# Patient Record
Sex: FEMALE | Race: WHITE | Employment: OTHER | URBAN - METROPOLITAN AREA
[De-identification: names, ages, dates, MRNs, and addresses within clinical notes are randomized per-mention and may not be internally consistent; named-entity substitution may affect disease eponyms.]

---

## 2020-03-04 ENCOUNTER — HOSPITAL ENCOUNTER (OUTPATIENT)
Age: 65
Discharge: ANOTHER HOSPITAL | End: 2020-03-04
Attending: EMERGENCY MEDICINE
Payer: MEDICARE

## 2020-03-04 ENCOUNTER — HOSPITAL ENCOUNTER (EMERGENCY)
Facility: HOSPITAL | Age: 65
Discharge: HOME | End: 2020-03-04
Attending: EMERGENCY MEDICINE
Payer: MEDICARE

## 2020-03-04 VITALS
DIASTOLIC BLOOD PRESSURE: 79 MMHG | WEIGHT: 162 LBS | BODY MASS INDEX: 32.66 KG/M2 | OXYGEN SATURATION: 96 % | HEART RATE: 74 BPM | HEIGHT: 59 IN | RESPIRATION RATE: 18 BRPM | SYSTOLIC BLOOD PRESSURE: 113 MMHG | TEMPERATURE: 98 F

## 2020-03-04 DIAGNOSIS — R10.31 BILATERAL LOWER ABDOMINAL PAIN: Primary | ICD-10-CM

## 2020-03-04 DIAGNOSIS — N30.00 ACUTE CYSTITIS WITHOUT HEMATURIA: Primary | ICD-10-CM

## 2020-03-04 DIAGNOSIS — R10.32 BILATERAL LOWER ABDOMINAL PAIN: Primary | ICD-10-CM

## 2020-03-04 LAB
ALBUMIN SERPL-MCNC: 4.1 G/DL (ref 3.4–5)
ALP LIVER SERPL-CCNC: 75 U/L (ref 50–130)
ALT SERPL-CCNC: 31 U/L (ref 13–56)
ANION GAP SERPL CALC-SCNC: 8 MMOL/L (ref 0–18)
AST SERPL-CCNC: 13 U/L (ref 15–37)
BASOPHILS # BLD AUTO: 0.06 X10(3) UL (ref 0–0.2)
BASOPHILS NFR BLD AUTO: 0.4 %
BILIRUB DIRECT SERPL-MCNC: 0.1 MG/DL (ref 0–0.2)
BILIRUB SERPL-MCNC: 0.6 MG/DL (ref 0.1–2)
BILIRUB UR QL STRIP: NEGATIVE
BILIRUB UR QL: NEGATIVE
BUN BLD-MCNC: 20 MG/DL (ref 7–18)
BUN/CREAT SERPL: 20.2 (ref 10–20)
CALCIUM BLD-MCNC: 9.4 MG/DL (ref 8.5–10.1)
CHLORIDE SERPL-SCNC: 107 MMOL/L (ref 98–112)
CLARITY UR: CLEAR
CO2 SERPL-SCNC: 25 MMOL/L (ref 21–32)
COLOR UR: YELLOW
CREAT BLD-MCNC: 0.99 MG/DL (ref 0.55–1.02)
DEPRECATED RDW RBC AUTO: 42.3 FL (ref 35.1–46.3)
EOSINOPHIL # BLD AUTO: 0 X10(3) UL (ref 0–0.7)
EOSINOPHIL NFR BLD AUTO: 0 %
ERYTHROCYTE [DISTWIDTH] IN BLOOD BY AUTOMATED COUNT: 12.5 % (ref 11–15)
GLUCOSE BLD-MCNC: 131 MG/DL (ref 70–99)
GLUCOSE UR STRIP-MCNC: NEGATIVE MG/DL
GLUCOSE UR-MCNC: NEGATIVE MG/DL
HCT VFR BLD AUTO: 40.1 % (ref 35–48)
HGB BLD-MCNC: 13.9 G/DL (ref 12–16)
IMM GRANULOCYTES # BLD AUTO: 0.05 X10(3) UL (ref 0–1)
IMM GRANULOCYTES NFR BLD: 0.4 %
KETONES UR-MCNC: 20 MG/DL
LIPASE SERPL-CCNC: 124 U/L (ref 73–393)
LYMPHOCYTES # BLD AUTO: 1.76 X10(3) UL (ref 1–4)
LYMPHOCYTES NFR BLD AUTO: 12.4 %
M PROTEIN MFR SERPL ELPH: 7.1 G/DL (ref 6.4–8.2)
MCH RBC QN AUTO: 32 PG (ref 26–34)
MCHC RBC AUTO-ENTMCNC: 34.7 G/DL (ref 31–37)
MCV RBC AUTO: 92.2 FL (ref 80–100)
MONOCYTES # BLD AUTO: 0.64 X10(3) UL (ref 0.1–1)
MONOCYTES NFR BLD AUTO: 4.5 %
NEUTROPHILS # BLD AUTO: 11.71 X10 (3) UL (ref 1.5–7.7)
NEUTROPHILS # BLD AUTO: 11.71 X10(3) UL (ref 1.5–7.7)
NEUTROPHILS NFR BLD AUTO: 82.3 %
NITRITE UR QL STRIP.AUTO: POSITIVE
NITRITE UR QL STRIP: POSITIVE
OSMOLALITY SERPL CALC.SUM OF ELEC: 294 MOSM/KG (ref 275–295)
PH UR STRIP: 5.5 [PH]
PH UR: 5 [PH] (ref 5–8)
PLATELET # BLD AUTO: 244 10(3)UL (ref 150–450)
POTASSIUM SERPL-SCNC: 4.4 MMOL/L (ref 3.5–5.1)
PROT UR STRIP-MCNC: NEGATIVE MG/DL
PROT UR-MCNC: NEGATIVE MG/DL
RBC # BLD AUTO: 4.35 X10(6)UL (ref 3.8–5.3)
RBC #/AREA URNS AUTO: 10 /HPF
SODIUM SERPL-SCNC: 140 MMOL/L (ref 136–145)
SP GR UR STRIP: 1.02
SP GR UR STRIP: 1.02 (ref 1–1.03)
UROBILINOGEN UR STRIP-ACNC: <2
UROBILINOGEN UR STRIP-ACNC: <2 MG/DL
WBC # BLD AUTO: 14.2 X10(3) UL (ref 4–11)
WBC #/AREA URNS AUTO: 72 /HPF

## 2020-03-04 PROCEDURE — 80076 HEPATIC FUNCTION PANEL: CPT | Performed by: EMERGENCY MEDICINE

## 2020-03-04 PROCEDURE — 99203 OFFICE O/P NEW LOW 30 MIN: CPT

## 2020-03-04 PROCEDURE — 96365 THER/PROPH/DIAG IV INF INIT: CPT

## 2020-03-04 PROCEDURE — 85025 COMPLETE CBC W/AUTO DIFF WBC: CPT

## 2020-03-04 PROCEDURE — 80048 BASIC METABOLIC PNL TOTAL CA: CPT

## 2020-03-04 PROCEDURE — 96375 TX/PRO/DX INJ NEW DRUG ADDON: CPT

## 2020-03-04 PROCEDURE — 87086 URINE CULTURE/COLONY COUNT: CPT | Performed by: EMERGENCY MEDICINE

## 2020-03-04 PROCEDURE — 81001 URINALYSIS AUTO W/SCOPE: CPT | Performed by: EMERGENCY MEDICINE

## 2020-03-04 PROCEDURE — 85025 COMPLETE CBC W/AUTO DIFF WBC: CPT | Performed by: EMERGENCY MEDICINE

## 2020-03-04 PROCEDURE — 87086 URINE CULTURE/COLONY COUNT: CPT

## 2020-03-04 PROCEDURE — 87088 URINE BACTERIA CULTURE: CPT | Performed by: EMERGENCY MEDICINE

## 2020-03-04 PROCEDURE — 87186 SC STD MICRODIL/AGAR DIL: CPT | Performed by: EMERGENCY MEDICINE

## 2020-03-04 PROCEDURE — 96366 THER/PROPH/DIAG IV INF ADDON: CPT

## 2020-03-04 PROCEDURE — 99284 EMERGENCY DEPT VISIT MOD MDM: CPT

## 2020-03-04 PROCEDURE — 99204 OFFICE O/P NEW MOD 45 MIN: CPT

## 2020-03-04 PROCEDURE — 80048 BASIC METABOLIC PNL TOTAL CA: CPT | Performed by: EMERGENCY MEDICINE

## 2020-03-04 PROCEDURE — 81001 URINALYSIS AUTO W/SCOPE: CPT

## 2020-03-04 PROCEDURE — 83690 ASSAY OF LIPASE: CPT | Performed by: EMERGENCY MEDICINE

## 2020-03-04 PROCEDURE — 81002 URINALYSIS NONAUTO W/O SCOPE: CPT

## 2020-03-04 RX ORDER — MELOXICAM 7.5 MG/1
7.5 TABLET ORAL DAILY PRN
Qty: 14 TABLET | Refills: 0 | Status: SHIPPED | OUTPATIENT
Start: 2020-03-04 | End: 2020-03-18

## 2020-03-04 RX ORDER — CEPHALEXIN 500 MG/1
500 CAPSULE ORAL 2 TIMES DAILY
Qty: 14 CAPSULE | Refills: 0 | Status: SHIPPED | OUTPATIENT
Start: 2020-03-04 | End: 2020-03-11

## 2020-03-04 RX ORDER — ACETAMINOPHEN 325 MG/1
325 TABLET ORAL EVERY 6 HOURS PRN
COMMUNITY

## 2020-03-04 RX ORDER — KETOROLAC TROMETHAMINE 15 MG/ML
15 INJECTION, SOLUTION INTRAMUSCULAR; INTRAVENOUS ONCE
Status: COMPLETED | OUTPATIENT
Start: 2020-03-04 | End: 2020-03-04

## 2020-03-04 RX ORDER — LISINOPRIL 5 MG/1
5 TABLET ORAL DAILY
COMMUNITY
End: 2020-04-06

## 2020-03-04 RX ORDER — IBUPROFEN 800 MG/1
800 TABLET ORAL EVERY 6 HOURS PRN
COMMUNITY
End: 2021-05-21

## 2020-03-04 NOTE — ED INITIAL ASSESSMENT (HPI)
Suprapubic pain on and off since yesterday- stts eating blueberries and feels its from that. stts never checks blood sugars also stts she is on metformin . Poor historian. stts from [facility] HAS ? DIVERTICULITIS, Kidney stones.

## 2020-03-05 ENCOUNTER — OFFICE VISIT (OUTPATIENT)
Dept: INTERNAL MEDICINE CLINIC | Facility: CLINIC | Age: 65
End: 2020-03-05
Payer: MEDICARE

## 2020-03-05 VITALS
SYSTOLIC BLOOD PRESSURE: 106 MMHG | DIASTOLIC BLOOD PRESSURE: 72 MMHG | TEMPERATURE: 97 F | HEART RATE: 86 BPM | WEIGHT: 170 LBS | BODY MASS INDEX: 34.27 KG/M2 | HEIGHT: 59 IN

## 2020-03-05 DIAGNOSIS — N30.00 ACUTE CYSTITIS WITHOUT HEMATURIA: Primary | ICD-10-CM

## 2020-03-05 PROCEDURE — 99202 OFFICE O/P NEW SF 15 MIN: CPT | Performed by: INTERNAL MEDICINE

## 2020-03-05 PROCEDURE — G0463 HOSPITAL OUTPT CLINIC VISIT: HCPCS | Performed by: INTERNAL MEDICINE

## 2020-03-05 NOTE — PROGRESS NOTES
HPI:    Patient ID: Jim Mayfield is a 62year old female. Abdominal Pain   This is a new problem. The current episode started in the past 7 days (3 days). The onset quality is sudden. The problem occurs constantly.  The most recent episode lasted 3 day Allergies:Not on File   PHYSICAL EXAM:   Physical Exam   Constitutional: She is oriented to person, place, and time. She appears well-developed and well-nourished. No distress. HENT:   Head: Normocephalic and atraumatic.    Right Ear: External ear nor Referrals:  None       EV#[id number]

## 2020-03-05 NOTE — ED PROVIDER NOTES
Patient Seen in: [facility] AND [facility] Emergency Department    History   Patient presents with:  Abdominal Pain    Stated Complaint: abdominal pain      HPI    27-year-old female with past medical history of diabetes, hypertension with self diagnosed IBS and w 75   Temp 97.7 °F (36.5 °C) (Oral)   Resp 18   Ht 149.9 cm (4' 11\")   Wt 73.5 kg   SpO2 96%   BMI 32.72 kg/m²         Physical Exam   Constitutional: No distress. Anxious. HEENT: MMM. Head: Normocephalic. Eyes: No injection. Cardiovascular: RRR. LAVENDER   RAINBOW DRAW LIGHT GREEN   RAINBOW DRAW GOLD   URINE CULTURE, ROUTINE            MDM     DIFFERENTIAL DIAGNOSIS: After history and physical exam differential diagnosis includes but is not limited to UTI, nephrolithiasis, pyelonephritis, colitis,

## 2020-03-06 ENCOUNTER — TELEPHONE (OUTPATIENT)
Dept: FAMILY MEDICINE CLINIC | Facility: CLINIC | Age: 65
End: 2020-03-06

## 2020-03-06 ENCOUNTER — TELEPHONE (OUTPATIENT)
Dept: INTERNAL MEDICINE CLINIC | Facility: CLINIC | Age: 65
End: 2020-03-06

## 2020-03-06 RX ORDER — PHENAZOPYRIDINE HYDROCHLORIDE 100 MG/1
100 TABLET, FILM COATED ORAL 3 TIMES DAILY PRN
Qty: 15 TABLET | Refills: 0 | Status: CANCELLED | OUTPATIENT
Start: 2020-03-06

## 2020-03-06 NOTE — TELEPHONE ENCOUNTER
Patient states someone from Dr. Cortez Marsh office called her, however, CSS unable to locate notes in the system regarding call.     Patient is requesting call back at ph# [phone]

## 2020-03-06 NOTE — TELEPHONE ENCOUNTER
Followed up with patient, advised of notes per Dr Kinza Collier, patient confirms she misunderstood, thought it was a medication she has to take, did confirm she does not have a symptoms at this time, no script is needed.

## 2020-03-06 NOTE — TELEPHONE ENCOUNTER
Patient states she seen Dr. Jozef Marie for an ER Follow up visit and Dr. Jozef Marie was supposed to send script for anti spasm medication to pharmacy, however, medication was never sent. Please advise.

## 2020-03-06 NOTE — TELEPHONE ENCOUNTER
She told me yesterday she no longer has any lower abd pain/bladder spasm that's why I didn't sent it but if she needs it we can send pyridium

## 2020-03-07 ENCOUNTER — TELEPHONE (OUTPATIENT)
Dept: INTERNAL MEDICINE CLINIC | Facility: CLINIC | Age: 65
End: 2020-03-07

## 2020-03-07 NOTE — TELEPHONE ENCOUNTER
Advised patient of Dr Shelton Rausch note. Patient verbalized understanding and had no further questions.

## 2020-03-07 NOTE — TELEPHONE ENCOUNTER
pls call pt; urine culture did show she has uti and bacteria isolated is sensitive to antbiotics given from ER so finish antiboitics as prescribed.

## 2020-03-13 ENCOUNTER — OFFICE VISIT (OUTPATIENT)
Dept: INTERNAL MEDICINE CLINIC | Facility: CLINIC | Age: 65
End: 2020-03-13
Payer: MEDICARE

## 2020-03-13 VITALS
HEIGHT: 59 IN | SYSTOLIC BLOOD PRESSURE: 96 MMHG | BODY MASS INDEX: 33.87 KG/M2 | HEART RATE: 72 BPM | DIASTOLIC BLOOD PRESSURE: 60 MMHG | TEMPERATURE: 98 F | WEIGHT: 168 LBS | RESPIRATION RATE: 18 BRPM

## 2020-03-13 DIAGNOSIS — Z12.11 COLON CANCER SCREENING: ICD-10-CM

## 2020-03-13 DIAGNOSIS — N30.00 ACUTE CYSTITIS WITHOUT HEMATURIA: Primary | ICD-10-CM

## 2020-03-13 DIAGNOSIS — I10 ESSENTIAL HYPERTENSION: ICD-10-CM

## 2020-03-13 DIAGNOSIS — E11.9 CONTROLLED TYPE 2 DIABETES MELLITUS WITHOUT COMPLICATION, WITHOUT LONG-TERM CURRENT USE OF INSULIN (HCC): ICD-10-CM

## 2020-03-13 PROCEDURE — G0463 HOSPITAL OUTPT CLINIC VISIT: HCPCS | Performed by: INTERNAL MEDICINE

## 2020-03-13 PROCEDURE — 99214 OFFICE O/P EST MOD 30 MIN: CPT | Performed by: INTERNAL MEDICINE

## 2020-03-13 NOTE — PROGRESS NOTES
HPI:    Patient ID: Sayra Hawkins is a 62year old female. Pt here for ffup of uti. Was seen in Er and given abx. Urine culture confirmed her uti and abx given was appropriate. Pt states symptoms had already resolved.      Diabetes   She presents for Negative. Respiratory: Negative. Negative for shortness of breath. Cardiovascular: Negative for chest pain. Gastrointestinal: Negative. Genitourinary: Negative. Skin: Negative.              Current Outpatient Medications   Medication Sig Disp is normal except bunion  Bilateral monofilament/sensation of both feet is normal.  Pulsation pedal pulse exam of both lower legs/feet is normal as well. Lymphadenopathy:     She has no cervical adenopathy.    Neurological: She is alert and oriented to p

## 2020-03-24 ENCOUNTER — TELEPHONE (OUTPATIENT)
Dept: INTERNAL MEDICINE CLINIC | Facility: CLINIC | Age: 65
End: 2020-03-24

## 2020-03-24 NOTE — TELEPHONE ENCOUNTER
Spoke with pt informed of Dr. Monica Jeffery note below and pt stts she gave urine sample in office. I informed pt it did not look like it was sent out to lab if it was collected in office. Dr. Monica Jeffery verbally informed and would like pt to have lab and urine test this week.

## 2020-03-26 ENCOUNTER — LAB ENCOUNTER (OUTPATIENT)
Dept: LAB | Age: 65
End: 2020-03-26
Attending: INTERNAL MEDICINE
Payer: MEDICARE

## 2020-03-26 DIAGNOSIS — Z12.11 COLON CANCER SCREENING: ICD-10-CM

## 2020-03-26 DIAGNOSIS — N30.00 ACUTE CYSTITIS WITHOUT HEMATURIA: ICD-10-CM

## 2020-03-26 DIAGNOSIS — E11.9 CONTROLLED TYPE 2 DIABETES MELLITUS WITHOUT COMPLICATION, WITHOUT LONG-TERM CURRENT USE OF INSULIN (HCC): ICD-10-CM

## 2020-03-26 LAB
BILIRUB UR QL: NEGATIVE
CHOLEST SMN-MCNC: 198 MG/DL (ref ?–200)
CLARITY UR: CLEAR
COLOR UR: YELLOW
CREAT UR-SCNC: 151 MG/DL
EST. AVERAGE GLUCOSE BLD GHB EST-MCNC: 134 MG/DL (ref 68–126)
GLUCOSE UR-MCNC: NEGATIVE MG/DL
HBA1C MFR BLD HPLC: 6.3 % (ref ?–5.7)
HDLC SERPL-MCNC: 47 MG/DL (ref 40–59)
HGB UR QL STRIP.AUTO: NEGATIVE
KETONES UR-MCNC: NEGATIVE MG/DL
LDLC SERPL CALC-MCNC: 107 MG/DL (ref ?–100)
MICROALBUMIN UR-MCNC: 1.56 MG/DL
MICROALBUMIN/CREAT 24H UR-RTO: 10.3 UG/MG (ref ?–30)
NITRITE UR QL STRIP.AUTO: NEGATIVE
NONHDLC SERPL-MCNC: 151 MG/DL (ref ?–130)
PATIENT FASTING Y/N/NP: YES
PH UR: 6 [PH] (ref 5–8)
PROT UR-MCNC: NEGATIVE MG/DL
RBC #/AREA URNS AUTO: 1 /HPF
SP GR UR STRIP: 1.02 (ref 1–1.03)
TRIGL SERPL-MCNC: 218 MG/DL (ref 30–149)
UROBILINOGEN UR STRIP-ACNC: <2
VLDLC SERPL CALC-MCNC: 44 MG/DL (ref 0–30)
WBC #/AREA URNS AUTO: 13 /HPF

## 2020-03-26 PROCEDURE — 36415 COLL VENOUS BLD VENIPUNCTURE: CPT

## 2020-03-26 PROCEDURE — 81001 URINALYSIS AUTO W/SCOPE: CPT

## 2020-03-26 PROCEDURE — 82570 ASSAY OF URINE CREATININE: CPT

## 2020-03-26 PROCEDURE — 83036 HEMOGLOBIN GLYCOSYLATED A1C: CPT

## 2020-03-26 PROCEDURE — 82043 UR ALBUMIN QUANTITATIVE: CPT

## 2020-03-26 PROCEDURE — 80061 LIPID PANEL: CPT

## 2020-03-26 PROCEDURE — 87086 URINE CULTURE/COLONY COUNT: CPT

## 2020-03-30 ENCOUNTER — OFFICE VISIT (OUTPATIENT)
Dept: INTERNAL MEDICINE CLINIC | Facility: CLINIC | Age: 65
End: 2020-03-30
Payer: MEDICARE

## 2020-03-30 ENCOUNTER — TELEPHONE (OUTPATIENT)
Dept: INTERNAL MEDICINE CLINIC | Facility: CLINIC | Age: 65
End: 2020-03-30

## 2020-03-30 VITALS
BODY MASS INDEX: 32.82 KG/M2 | SYSTOLIC BLOOD PRESSURE: 98 MMHG | RESPIRATION RATE: 12 BRPM | DIASTOLIC BLOOD PRESSURE: 64 MMHG | HEIGHT: 59.5 IN | WEIGHT: 165 LBS | TEMPERATURE: 98 F | HEART RATE: 94 BPM

## 2020-03-30 DIAGNOSIS — E78.5 DYSLIPIDEMIA: ICD-10-CM

## 2020-03-30 DIAGNOSIS — E11.9 CONTROLLED TYPE 2 DIABETES MELLITUS WITHOUT COMPLICATION, WITHOUT LONG-TERM CURRENT USE OF INSULIN (HCC): ICD-10-CM

## 2020-03-30 DIAGNOSIS — N30.00 ACUTE CYSTITIS WITHOUT HEMATURIA: Primary | ICD-10-CM

## 2020-03-30 DIAGNOSIS — R10.30 LOWER ABDOMINAL PAIN: Primary | ICD-10-CM

## 2020-03-30 PROCEDURE — G0463 HOSPITAL OUTPT CLINIC VISIT: HCPCS | Performed by: INTERNAL MEDICINE

## 2020-03-30 PROCEDURE — 99213 OFFICE O/P EST LOW 20 MIN: CPT | Performed by: INTERNAL MEDICINE

## 2020-03-30 RX ORDER — NITROFURANTOIN 25; 75 MG/1; MG/1
100 CAPSULE ORAL 2 TIMES DAILY
Qty: 14 CAPSULE | Refills: 0 | Status: SHIPPED | OUTPATIENT
Start: 2020-03-30 | End: 2020-04-02

## 2020-03-30 RX ORDER — PHENAZOPYRIDINE HYDROCHLORIDE 100 MG/1
100 TABLET, FILM COATED ORAL 3 TIMES DAILY PRN
Qty: 15 TABLET | Refills: 0 | Status: SHIPPED | OUTPATIENT
Start: 2020-03-30

## 2020-03-30 NOTE — TELEPHONE ENCOUNTER
ER a week ago  Bad bladder infeciton  Went to [place] for follow up   And did uirne test   Found out last week  Came in for blood work  Last nigh and tonight pain  painin bladder  No burning  Frequency yes   Drinking a lot of water  No fever  No back pain

## 2020-03-30 NOTE — PROGRESS NOTES
HPI:    Patient ID: Frederich Crigler is a 60year old female. UTI   This is a recurrent problem. The current episode started 1 to 4 weeks ago. The problem occurs intermittently. Associated symptoms include urinary symptoms.  Pertinent negatives include n will send out another urine culture. Pt told do midstrean clean catch.     (E11.9) Controlled type 2 diabetes mellitus without complication, without long-term current use of insulin ([facility])  Plan: a1c was 6.3 so her dm controlled.  Continue with current dm me

## 2020-04-02 ENCOUNTER — TELEPHONE (OUTPATIENT)
Dept: INTERNAL MEDICINE CLINIC | Facility: CLINIC | Age: 65
End: 2020-04-02

## 2020-04-02 RX ORDER — AMPICILLIN 500 MG/1
500 CAPSULE ORAL 4 TIMES DAILY
Qty: 20 CAPSULE | Refills: 0 | Status: SHIPPED | OUTPATIENT
Start: 2020-04-02 | End: 2020-10-19 | Stop reason: ALTCHOICE

## 2020-04-02 NOTE — TELEPHONE ENCOUNTER
Spoke with patient ( verified) and relayed Dr. Nolen Essex message below--patient verbalizes understanding and agreement. Ampicillin sent to [facility] per patient request--she is no longer using [facility] [place].  No further questions/concern

## 2020-04-03 ENCOUNTER — TELEPHONE (OUTPATIENT)
Dept: INTERNAL MEDICINE CLINIC | Facility: CLINIC | Age: 65
End: 2020-04-03

## 2020-04-04 NOTE — TELEPHONE ENCOUNTER
Pt called back and feels better this am; never had swelling of the tongue, only had some soreness. Had been able to take pcn in the past without reaction.  She did take benadryl last night as advised by Dr Shaggy Knapp on call MD.   I told her not to take ampicil

## 2020-04-04 NOTE — TELEPHONE ENCOUNTER
aziza.  Patient called in the evening, complaining of the 1 part of the tongue is sore concerned that this is allergic reaction to ampicillin, she took 2 doses of ampicillin started last night, denies swelling of the lips or difficulty breathing, advised her

## 2020-04-06 ENCOUNTER — TELEPHONE (OUTPATIENT)
Dept: INTERNAL MEDICINE CLINIC | Facility: CLINIC | Age: 65
End: 2020-04-06

## 2020-04-06 RX ORDER — LISINOPRIL 5 MG/1
5 TABLET ORAL DAILY
Qty: 90 TABLET | Refills: 1 | Status: SHIPPED | OUTPATIENT
Start: 2020-04-06 | End: 2020-11-25

## 2020-04-06 NOTE — TELEPHONE ENCOUNTER
Patient called to advised that she needs her medication refilled. She was wondering if Dr. Sridevi Garcia would refill this even though he has not filled it for her before.       Patient advised that she only has 3 doses left     Please send it to the [facility]:  [address]     Medication Needed:   lisinopril 5 MG Oral Tab

## 2020-04-20 ENCOUNTER — TELEPHONE (OUTPATIENT)
Dept: INTERNAL MEDICINE CLINIC | Facility: CLINIC | Age: 65
End: 2020-04-20

## 2020-04-20 NOTE — TELEPHONE ENCOUNTER
No answer and unable to leave message since voicemail box is not set up. Staff to try again at another time.

## 2020-04-20 NOTE — TELEPHONE ENCOUNTER
Labs from 3/26/20 indicate Dr. Reyes Urbina spoke with pt about the labs. Please see what questions has specifically?

## 2020-04-21 NOTE — TELEPHONE ENCOUNTER
Patient wanted to know what her LDL and HDL levels. Patient will be providing information to her previous provider since she will be moving back after COVID 19 pandemic has improved.  Patient states she has spoken to someone who was mailing release info

## 2020-05-08 ENCOUNTER — TELEPHONE (OUTPATIENT)
Dept: INTERNAL MEDICINE CLINIC | Facility: CLINIC | Age: 65
End: 2020-05-08

## 2020-05-08 NOTE — TELEPHONE ENCOUNTER
Name:[facility] 5/8/2020 8:02:36 AM  ProfileId:  PagerID [phone]  Department:[facility]  ======================================================================  Paging    Message # [phone] 05/08/2020 12:41a [[email or web address]]  To:  From: CONSOLE  Primary

## 2020-07-09 ENCOUNTER — NURSE TRIAGE (OUTPATIENT)
Dept: INTERNAL MEDICINE CLINIC | Facility: CLINIC | Age: 65
End: 2020-07-09

## 2020-07-09 ENCOUNTER — OFFICE VISIT (OUTPATIENT)
Dept: INTERNAL MEDICINE CLINIC | Facility: CLINIC | Age: 65
End: 2020-07-09
Payer: MEDICARE

## 2020-07-09 VITALS
HEIGHT: 59 IN | BODY MASS INDEX: 34.27 KG/M2 | DIASTOLIC BLOOD PRESSURE: 69 MMHG | HEART RATE: 71 BPM | TEMPERATURE: 97 F | WEIGHT: 170 LBS | SYSTOLIC BLOOD PRESSURE: 107 MMHG

## 2020-07-09 DIAGNOSIS — S61.209A OPEN WOUND OF FINGER, INITIAL ENCOUNTER: Primary | ICD-10-CM

## 2020-07-09 PROCEDURE — G0463 HOSPITAL OUTPT CLINIC VISIT: HCPCS | Performed by: INTERNAL MEDICINE

## 2020-07-09 PROCEDURE — 99213 OFFICE O/P EST LOW 20 MIN: CPT | Performed by: INTERNAL MEDICINE

## 2020-07-09 RX ORDER — CEPHALEXIN 500 MG/1
500 CAPSULE ORAL 2 TIMES DAILY
Qty: 14 CAPSULE | Refills: 0 | Status: SHIPPED | OUTPATIENT
Start: 2020-07-09 | End: 2020-10-19 | Stop reason: ALTCHOICE

## 2020-07-09 NOTE — PROGRESS NOTES
HPI:    Patient ID: Gurvinder Su is a 60year old female. Wound Recheck   This is a new (pt had wound on left 5th finger from broken  glass) problem. The current episode started in the past 7 days (5 days). The problem occurs constantly.  Pertinent n diagnosis)  Plan: pt had noted pus discharge yesterday but none noted today on exam. We will put her on keflex 500mg po bid 5 to 7 days . Continue wound care. Pt states she had her tetanus booster in last 5 yrs.  Pt told to call back if wound doesn't heal.

## 2020-07-09 NOTE — TELEPHONE ENCOUNTER
Action Requested: Summary for Provider     []  Critical Lab, Recommendations Needed  [] Need Additional Advice  []   FYI    []   Need Orders  [] Need Medications Sent to Pharmacy  []  Other     SUMMARY: appt scheduled today with Dr. Sebastián Silverman. Pt states s

## 2020-08-10 ENCOUNTER — NURSE TRIAGE (OUTPATIENT)
Dept: INTERNAL MEDICINE CLINIC | Facility: CLINIC | Age: 65
End: 2020-08-10

## 2020-08-10 NOTE — TELEPHONE ENCOUNTER
Action Requested: Summary for Provider     []  Critical Lab, Recommendations Needed  [x] Need Additional Advice  []   FYI    []   Need Orders  [] Need Medications Sent to Pharmacy  []  Other     SUMMARY: Pt declining office/virtual visit at this time; Larned Garcia

## 2020-08-28 ENCOUNTER — TELEPHONE (OUTPATIENT)
Dept: INTERNAL MEDICINE CLINIC | Facility: CLINIC | Age: 65
End: 2020-08-28

## 2020-08-28 NOTE — TELEPHONE ENCOUNTER
Good Morning,    Patient needs to reach out to the outside opthamologist she saw for suggestions or contact her PCP. Patient can also contact the member service number on the back of her insurance card for a list of providers that do specialize in glaucoma, we can provide a list of opthamologist within network but we do not provide recommendations as to if they specialize in glaucoma. I tried to reach out to patient but voice-mail is not set up.       Kind Regards,    [address] [place] [address]-Referral Specialist

## 2020-08-28 NOTE — TELEPHONE ENCOUNTER
Reginald Scheuermann called in, she did go to see an outside optham[facility] in [address] [place] [address]. She was recommended to seek out an opthalmologist that has a clinical interest in glaucoma because she has a \"neuro opening\" in her left eye and they recommended she get the pressure checked. She was told she is at risk for losing vision so please get back to her ASAP. I recommended Dr. Hugo Downey but he is booked out until roughly thanksgiving. Is there any way you can get that moved up or is there anyone you know of who would be able to get her in before that? Patient doesn't have voicemail. Patient has Medicare Part B and doesn't technically need a referral so please call her with recommendation ASAP. [email or web address]- if at all possible can we email this patient with the name of recommendation IF unable to reach by phone

## 2020-10-19 ENCOUNTER — NURSE TRIAGE (OUTPATIENT)
Dept: INTERNAL MEDICINE CLINIC | Facility: CLINIC | Age: 65
End: 2020-10-19

## 2020-10-19 ENCOUNTER — TELEMEDICINE (OUTPATIENT)
Dept: FAMILY MEDICINE CLINIC | Facility: CLINIC | Age: 65
End: 2020-10-19
Payer: COMMERCIAL

## 2020-10-19 DIAGNOSIS — Z20.822 SUSPECTED 2019 NOVEL CORONAVIRUS INFECTION: Primary | ICD-10-CM

## 2020-10-19 PROCEDURE — 99204 OFFICE O/P NEW MOD 45 MIN: CPT | Performed by: NURSE PRACTITIONER
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
## 2020-10-19 NOTE — TELEPHONE ENCOUNTER
Action Requested: Summary for Provider     []  Critical Lab, Recommendations Needed  [] Need Additional Advice  []   FYI    []   Need Orders  [] Need Medications Sent to Pharmacy  []  Other     SUMMARY: Patient calling with sore throat, cough, congestion w

## 2020-10-20 ENCOUNTER — APPOINTMENT (OUTPATIENT)
Dept: LAB | Age: 65
End: 2020-10-20
Attending: NURSE PRACTITIONER
Payer: MEDICARE

## 2020-10-20 DIAGNOSIS — Z20.822 SUSPECTED 2019 NOVEL CORONAVIRUS INFECTION: ICD-10-CM

## 2020-10-20 NOTE — ASSESSMENT & PLAN NOTE
Will test for covid 19 due to unknown illness of brother  Enc self quarantine per cdc guidelines  Supportive care discussed to help alleviate symptoms  May try otc coricidin hbp for s/s  Please call if symptoms worsen or are not resolving.

## 2020-10-22 ENCOUNTER — TELEPHONE (OUTPATIENT)
Dept: FAMILY MEDICINE CLINIC | Facility: CLINIC | Age: 65
End: 2020-10-22

## 2020-11-24 NOTE — TELEPHONE ENCOUNTER
Dr. Viramontes Neighbours: patient called for refill request.     pharmacy was to contact us, I don';t see communication.      Requested Prescriptions     Pending Prescriptions Disp Refills   • lisinopril 5 MG Oral Tab 90 tablet 1     Sig: Take 1 tablet (5 mg total) b

## 2020-11-25 RX ORDER — LISINOPRIL 5 MG/1
5 TABLET ORAL DAILY
Qty: 90 TABLET | Refills: 1 | Status: SHIPPED | OUTPATIENT
Start: 2020-11-25 | End: 2021-05-25

## 2020-11-25 NOTE — TELEPHONE ENCOUNTER
Spoke with pt,  verified. Pt was informed of med ref for lisinopril was sent to [facility]. She  stated understanding.

## 2020-12-16 ENCOUNTER — NURSE TRIAGE (OUTPATIENT)
Dept: INTERNAL MEDICINE CLINIC | Facility: CLINIC | Age: 65
End: 2020-12-16

## 2020-12-16 NOTE — TELEPHONE ENCOUNTER
Action Requested: Summary for Provider     []  Critical Lab, Recommendations Needed  [] Need Additional Advice  []   FYI    []   Need Orders  [] Need Medications Sent to Pharmacy  []  Other     SUMMARY: OV 12/17/20 with PCP.     Reason for call: Skin Proble

## 2020-12-17 NOTE — TELEPHONE ENCOUNTER
CSS transferred the call, states that patient cancelled her appointment today. Spoke with patient and  states that she cancelled her appointment  as she cannot make it today. States that her left hand =second to the thumb finger is very red,sore . antionette

## 2021-01-12 NOTE — TELEPHONE ENCOUNTER
Phone call to patient. Phone rings multiple times and then goes to busy signal. Unable to leave message. Will try again later or tomorrow.

## 2021-01-12 NOTE — TELEPHONE ENCOUNTER
Patient is requesting a refill for the medication below and states is going to new [facility] [place] in [place] and states almost out of medication.      •  metFORMIN HCl 1000 MG Oral Tab, Take 1,000 mg by mouth 2 (two) times daily with meals

## 2021-01-15 NOTE — TELEPHONE ENCOUNTER
Spoke with patient, scheduled appt for 1/20/21  Patient is requesting any labs she needs to get done, to be ordered so they can be discussed at visit. last labs done one 3/2020. Dr. Reyes Urbina can you please order labs?     Pt would like phone call informi

## 2021-01-18 ENCOUNTER — TELEPHONE (OUTPATIENT)
Dept: INTERNAL MEDICINE CLINIC | Facility: CLINIC | Age: 66
End: 2021-01-18

## 2021-01-18 DIAGNOSIS — E11.9 CONTROLLED TYPE 2 DIABETES MELLITUS WITHOUT COMPLICATION, WITHOUT LONG-TERM CURRENT USE OF INSULIN (HCC): Primary | ICD-10-CM

## 2021-01-18 DIAGNOSIS — E78.5 DYSLIPIDEMIA: ICD-10-CM

## 2021-01-18 NOTE — TELEPHONE ENCOUNTER
Dr. Alverto Wagner, patient is schedule for a diabetic follow up on 01/25/2021. Patient is requesting blood test order for appointment. Please advise.

## 2021-01-18 NOTE — TELEPHONE ENCOUNTER
Pt has an appointment scheduled for 1-25-21 for follow up on diabetes. Pt would like to know if the doctor can put in an order for her to get her blood work done prior to her appointment. Please, call pt when the order is in the system.

## 2021-04-22 NOTE — TELEPHONE ENCOUNTER
Chief complaint:   Chief Complaint   Patient presents with   • Office Visit     vaginal discharge        Vitals:  Visit Vitals  /80 (BP Location: RUE - Right upper extremity, Patient Position: Sitting, Cuff Size: Regular)   Pulse 70   Resp 16   Ht 5' (1.524 m)   Wt 50.8 kg   LMP 01/19/2020   BMI 21.87 kg/m²       HISTORY OF PRESENT ILLNESS     Vaginal Discharge  The patient's primary symptoms include vaginal discharge. The patient's pertinent negatives include no genital itching, genital lesions, genital odor, genital rash, missed menses, pelvic pain or vaginal bleeding. This is a new problem. The current episode started in the past 7 days. The problem occurs constantly. The problem has been unchanged. The pain is mild. She is not pregnant. Pertinent negatives include no abdominal pain, anorexia, back pain, chills, constipation, diarrhea, discolored urine, dysuria, fever, flank pain, frequency, headaches, hematuria, joint pain, joint swelling, nausea, painful intercourse, rash, sore throat, urgency or vomiting. The vaginal discharge was milky, thick and white. There has been no bleeding. She has not been passing clots. She has not been passing tissue. Nothing aggravates the symptoms. Treatments tried: OTC yeast medication and miconazole  The treatment provided no relief. She is sexually active. No, her partner does not have an STD. She uses condoms and oral contraceptives for contraception. Her menstrual history has been regular.       Other significant problems:  Patient Active Problem List    Diagnosis Date Noted   • Family history of colon cancer in mother 02/10/2020     Priority: Low       PAST MEDICAL, FAMILY AND SOCIAL HISTORY     Medications:  Current Outpatient Medications   Medication   • levonorgestrel-eth estradiol & eth estradiol (SEASONIQUE) 0.15-0.03 &0.01 MG tablet   • albuterol (PROAIR HFA) 108 (90 Base) MCG/ACT inhaler   • fluticasone (FLOVENT HFA) 110 MCG/ACT inhaler     No current  Patient will call in may to schedule an appointment facility-administered medications for this visit.        Allergies:  ALLERGIES:   Allergen Reactions   • Dust Other (See Comments)   • Grass Other (See Comments)   • Penicillin G HIVES and SWELLING   • Trees Other (See Comments)       Past Medical  History/Surgeries:  History reviewed. No pertinent past medical history.    History reviewed. No pertinent surgical history.    Family History:  Family History   Problem Relation Age of Onset   • Cancer Mother    • Cancer, Colon Mother 38   • Heart Sister         Murmur       Social History:  Social History     Tobacco Use   • Smoking status: Never Smoker   • Smokeless tobacco: Never Used   Substance Use Topics   • Alcohol use: Not Currently       REVIEW OF SYSTEMS     Review of Systems   Constitutional: Negative for chills and fever.   HENT: Negative for sore throat.    Gastrointestinal: Negative for abdominal pain, anorexia, constipation, diarrhea, nausea and vomiting.   Genitourinary: Positive for vaginal discharge. Negative for dysuria, flank pain, frequency, hematuria, missed menses, pelvic pain and urgency.   Musculoskeletal: Negative for back pain and joint pain.   Skin: Negative for rash.   Neurological: Negative for headaches.   All other systems reviewed and are negative.      PHYSICAL EXAM     Physical Exam  Vitals signs and nursing note reviewed.   Constitutional:       Appearance: Normal appearance.   HENT:      Head: Atraumatic.   Cardiovascular:      Rate and Rhythm: Normal rate.      Pulses: Normal pulses.   Genitourinary:     Cervix: Discharge present.         ASSESSMENT/PLAN     ASSESSMENT/PLAN:  1. Vaginal discharge      Orders Placed This Encounter   • WET MOUNT   • VAGINAL PATHOGENS         1. Vaginal discharge  - will call with results and treat accordingly.   - WET MOUNT  - VAGINAL PATHOGENS      HEALTH MAINTENANCE:    Patient is due for the following health Maintenance, they were discussed and recommended.   Health Maintenance Due   Topic Date Due    • Pneumococcal Vaccine 0-64 (1 of 1 - PPSV23) 01/19/2005   • Depression Screening  02/17/2021               All the above plans and medication(s) side effect profile were discussed with the patient in details, questions were answered to the patient's satisfaction.  Patient verbalized understanding and was agreeable to the above plan.       FOLLOW UP:   Return if symptoms worsen or fail to improve.

## 2021-05-21 NOTE — TELEPHONE ENCOUNTER
Per patient she needs refill on her Ibuprofen and patient is out of med. Current Outpatient Medications   Medication Sig Dispense Refill   •       •       •       • ibuprofen 800 MG Oral Tab Take 800 mg by mouth every 6 (six) hours as needed for Pain.

## 2021-05-21 NOTE — TELEPHONE ENCOUNTER
Spoke with patient ( verified) and relayed Dr. Marychuy Buckley message below--patient verbalizes understanding and agreement. Patient already has appt scheduled with PCP and will complete 2021 labs in the next week or so--\"give or take.  I just ta

## 2021-05-22 RX ORDER — IBUPROFEN 800 MG/1
800 TABLET ORAL EVERY 6 HOURS PRN
Qty: 30 TABLET | Refills: 0 | Status: SHIPPED | OUTPATIENT
Start: 2021-05-22

## 2021-05-25 RX ORDER — LISINOPRIL 5 MG/1
5 TABLET ORAL DAILY
Qty: 90 TABLET | Refills: 0 | Status: SHIPPED | OUTPATIENT
Start: 2021-05-25 | End: 2021-08-02

## 2021-05-27 ENCOUNTER — TELEPHONE (OUTPATIENT)
Dept: INTERNAL MEDICINE CLINIC | Facility: CLINIC | Age: 66
End: 2021-05-27

## 2021-05-27 NOTE — TELEPHONE ENCOUNTER
Sal Adan reported the following as being completed:     Test name: diabetic eye exam  Date test performed: patient completed test earlier this year but does not remember exact date   Performing Provider/Location: Dr. Lyndsay Conway  Performing Provider/Loc

## 2021-05-28 ENCOUNTER — OFFICE VISIT (OUTPATIENT)
Dept: FAMILY MEDICINE CLINIC | Facility: CLINIC | Age: 66
End: 2021-05-28
Payer: COMMERCIAL

## 2021-05-28 ENCOUNTER — NURSE TRIAGE (OUTPATIENT)
Dept: INTERNAL MEDICINE CLINIC | Facility: CLINIC | Age: 66
End: 2021-05-28

## 2021-05-28 VITALS
BODY MASS INDEX: 34.47 KG/M2 | DIASTOLIC BLOOD PRESSURE: 68 MMHG | HEIGHT: 59 IN | SYSTOLIC BLOOD PRESSURE: 106 MMHG | HEART RATE: 97 BPM | WEIGHT: 171 LBS

## 2021-05-28 DIAGNOSIS — R30.0 DYSURIA: Primary | ICD-10-CM

## 2021-05-28 DIAGNOSIS — E11.9 TYPE 2 DIABETES MELLITUS WITHOUT COMPLICATION, WITHOUT LONG-TERM CURRENT USE OF INSULIN (HCC): ICD-10-CM

## 2021-05-28 PROBLEM — Q65.89 CONGENITAL HIP DYSPLASIA: Status: ACTIVE | Noted: 2021-05-28

## 2021-05-28 PROCEDURE — 3078F DIAST BP <80 MM HG: CPT | Performed by: NURSE PRACTITIONER

## 2021-05-28 PROCEDURE — 3074F SYST BP LT 130 MM HG: CPT | Performed by: NURSE PRACTITIONER

## 2021-05-28 PROCEDURE — 99213 OFFICE O/P EST LOW 20 MIN: CPT | Performed by: NURSE PRACTITIONER

## 2021-05-28 PROCEDURE — 3008F BODY MASS INDEX DOCD: CPT | Performed by: NURSE PRACTITIONER

## 2021-05-28 PROCEDURE — 81003 URINALYSIS AUTO W/O SCOPE: CPT | Performed by: NURSE PRACTITIONER

## 2021-05-28 RX ORDER — NITROFURANTOIN 25; 75 MG/1; MG/1
100 CAPSULE ORAL 2 TIMES DAILY
Qty: 14 CAPSULE | Refills: 0 | Status: SHIPPED | OUTPATIENT
Start: 2021-05-28 | End: 2021-08-02 | Stop reason: ALTCHOICE

## 2021-05-28 NOTE — PROGRESS NOTES
HPI  Pt here for urinary discomfort and frequency. Symptoms started a couple of days ago. Review of Systems   Genitourinary: Positive for dysuria (discomfort; no pain w urination) and frequency (feels as if she can't hold her urine as well).  Negative Types: Cigarettes      Smokeless tobacco: Never Used      Tobacco comment: occasional smoker, stopped in [id number]    Vaping Use      Vaping Use: Never used    Substance and Sexual Activity      Alcohol use: Yes      Drug use: Never      Sexual activity every 6 (six) hours as needed for Pain. Allergies:  No Known Allergies    Physical Exam  Vitals and nursing note reviewed. Constitutional:       Appearance: Normal appearance.    Cardiovascular:      Rate and Rhythm: Normal rate and regular rhythm

## 2021-05-28 NOTE — TELEPHONE ENCOUNTER
Action Requested: Summary for Provider     []  Critical Lab, Recommendations Needed  [] Need Additional Advice  []   FYI    []   Need Orders  [] Need Medications Sent to Pharmacy  []  Other     SUMMARY: Office appointment made today.       Future Appoin

## 2021-05-28 NOTE — PATIENT INSTRUCTIONS
Urinary Tract Infections in Women  Urinary tract infections (UTIs) are most often caused by bacteria. These bacteria enter the urinary tract. The bacteria may come from inside the body.  Or they may travel from the skin outside the rectum or vagina into t They may also help prevent future UTIs. · Drink plenty of fluids. This includes water, juice, or other caffeine-free drinks. Fluids help flush bacteria out of your body. · Empty your bladder. Always empty your bladder when you feel the urge to pee.  And tests that may be done are the urinalysis and urine culture. Types of UTIs  · Cystitis. A bladder infection (cystitis) is the most common UTI in women. You may have urgent or frequent need to pee.  You may also have pain, burning when you pee, and blood bacteria. Also don't use spermicides during sex. These can increase the risk for UTIs. Choose other forms of birth control instead. For women who tend to get UTIs after sex, a low-dose of a preventive antibiotic may be used.  Be sure to discuss this option

## 2021-05-29 PROBLEM — E11.9 TYPE 2 DIABETES MELLITUS WITHOUT COMPLICATION, WITHOUT LONG-TERM CURRENT USE OF INSULIN (HCC): Status: ACTIVE | Noted: 2021-05-29

## 2021-06-01 ENCOUNTER — TELEPHONE (OUTPATIENT)
Dept: INTERNAL MEDICINE CLINIC | Facility: CLINIC | Age: 66
End: 2021-06-01

## 2021-06-01 DIAGNOSIS — N39.0 ACUTE UTI: Primary | ICD-10-CM

## 2021-06-01 DIAGNOSIS — R30.0 DYSURIA: ICD-10-CM

## 2021-06-01 DIAGNOSIS — E11.9 CONTROLLED TYPE 2 DIABETES MELLITUS WITHOUT COMPLICATION, WITHOUT LONG-TERM CURRENT USE OF INSULIN (HCC): ICD-10-CM

## 2021-06-01 NOTE — TELEPHONE ENCOUNTER
The best way to explain this is glucose or sugar starts spilling out to the urine when the blood glucose levels are 180 and above.  So the presence of sugar in the urine indicates that her blood glucose level at that time is >180 so diabetes not so well con

## 2021-06-01 NOTE — TELEPHONE ENCOUNTER
Phone call to Dr. Florie Duverney office to request last DM eye exam consult notes. They will fax notes today to Michell's attention.

## 2021-06-01 NOTE — TELEPHONE ENCOUNTER
[facility] eye examination record received; exam date 11-6-2020 received and placed in [address] in basket to review.

## 2021-06-01 NOTE — TELEPHONE ENCOUNTER
Action Requested: Summary for Provider     []  Critical Lab, Recommendations Needed  [] Need Additional Advice  [x]   FYI    []   Need Orders  [] Need Medications Sent to Pharmacy  []  Other     SUMMARY: Patient calling back wanting clarification on her ur

## 2021-06-01 NOTE — TELEPHONE ENCOUNTER
Advised patient of Dr. Amador Cancer note. Patient verbalized understanding. Patient stated that will get the fasting blood work done when her urinary tract infection has cleared up.

## 2021-06-02 RX ORDER — BLOOD SUGAR DIAGNOSTIC
1 STRIP MISCELLANEOUS 2 TIMES DAILY
Qty: 200 STRIP | Refills: 3 | Status: SHIPPED | OUTPATIENT
Start: 2021-06-02 | End: 2021-08-02

## 2021-06-02 RX ORDER — BLOOD-GLUCOSE METER
1 EACH MISCELLANEOUS 2 TIMES DAILY
Qty: 1 KIT | Refills: 0 | Status: SHIPPED | OUTPATIENT
Start: 2021-06-02

## 2021-06-02 RX ORDER — LANCETS
1 EACH MISCELLANEOUS 2 TIMES DAILY
Qty: 200 EACH | Refills: 3 | Status: SHIPPED | OUTPATIENT
Start: 2021-06-02 | End: 2021-08-02

## 2021-06-02 NOTE — TELEPHONE ENCOUNTER
Spoke with patient, (  verified ) informed of Dr. Dr. Mario Worthy    instructions below and that her diabetic testing supplies were sent to her pharmacy today   Patient verbalizes understanding and agrees.

## 2021-06-02 NOTE — TELEPHONE ENCOUNTER
Patient calling back, with questions and concerns, kind of anxious .   Requesting to explain again her  urine test results and why there is glucose showing in there =informed that her blood sugar was high during that time when she did her urine test, from urine test to check if she is clear from UTI, also requesting Blood sugar machine and supplies, pended for approval.    Please reply to pool: CONCETTA Rene

## 2021-06-02 NOTE — TELEPHONE ENCOUNTER
Paged on call re: adverse medication effect. New rx macrobid for UTI 5/28/21, on metformin for some time, having liquid stools, read medication insert, became concerned. Denies abd pain, no blood in stool, feels well overall. Pt reassured.  Advised could b

## 2021-06-02 NOTE — TELEPHONE ENCOUNTER
We usually dont check ua until a few days after abx are done and if pt's symptoms has not resolve. Since she has apptment with me next week, then we will do ua at that time.  I will also discuss with her dm meds if need to be adjusted or even change, once I

## 2021-06-07 ENCOUNTER — LAB ENCOUNTER (OUTPATIENT)
Dept: LAB | Age: 66
End: 2021-06-07
Attending: INTERNAL MEDICINE
Payer: MEDICARE

## 2021-06-07 DIAGNOSIS — E11.9 CONTROLLED TYPE 2 DIABETES MELLITUS WITHOUT COMPLICATION, WITHOUT LONG-TERM CURRENT USE OF INSULIN (HCC): ICD-10-CM

## 2021-06-07 DIAGNOSIS — R30.0 DYSURIA: ICD-10-CM

## 2021-06-07 DIAGNOSIS — E78.5 DYSLIPIDEMIA: ICD-10-CM

## 2021-06-07 PROCEDURE — 87077 CULTURE AEROBIC IDENTIFY: CPT

## 2021-06-07 PROCEDURE — 80053 COMPREHEN METABOLIC PANEL: CPT

## 2021-06-07 PROCEDURE — 80061 LIPID PANEL: CPT

## 2021-06-07 PROCEDURE — 36415 COLL VENOUS BLD VENIPUNCTURE: CPT

## 2021-06-07 PROCEDURE — 83036 HEMOGLOBIN GLYCOSYLATED A1C: CPT

## 2021-06-07 PROCEDURE — 82043 UR ALBUMIN QUANTITATIVE: CPT

## 2021-06-07 PROCEDURE — 82570 ASSAY OF URINE CREATININE: CPT

## 2021-06-07 PROCEDURE — 87086 URINE CULTURE/COLONY COUNT: CPT

## 2021-06-07 PROCEDURE — 81001 URINALYSIS AUTO W/SCOPE: CPT

## 2021-06-08 ENCOUNTER — TELEPHONE (OUTPATIENT)
Dept: INTERNAL MEDICINE CLINIC | Facility: CLINIC | Age: 66
End: 2021-06-08

## 2021-06-08 NOTE — TELEPHONE ENCOUNTER
Pt calling for test results.  Pt has f/u OV 6/9/21     Advised pt can review at [address] tomorrow and pt agrees to plan    See Urine Culture results    Pt also confirmed she was fasting for labs    Pt wanted to let Dr Leopoldo Kent know \"I am not taking cholesterol

## 2021-06-09 ENCOUNTER — OFFICE VISIT (OUTPATIENT)
Dept: INTERNAL MEDICINE CLINIC | Facility: CLINIC | Age: 66
End: 2021-06-09
Payer: COMMERCIAL

## 2021-06-09 VITALS
SYSTOLIC BLOOD PRESSURE: 114 MMHG | HEIGHT: 59 IN | WEIGHT: 165 LBS | BODY MASS INDEX: 33.26 KG/M2 | HEART RATE: 105 BPM | TEMPERATURE: 97 F | DIASTOLIC BLOOD PRESSURE: 78 MMHG

## 2021-06-09 DIAGNOSIS — Z78.0 MENOPAUSE: ICD-10-CM

## 2021-06-09 DIAGNOSIS — Z12.31 ENCOUNTER FOR SCREENING MAMMOGRAM FOR BREAST CANCER: ICD-10-CM

## 2021-06-09 DIAGNOSIS — I15.2 HYPERTENSION ASSOCIATED WITH TYPE 2 DIABETES MELLITUS (HCC): ICD-10-CM

## 2021-06-09 DIAGNOSIS — N30.00 ACUTE CYSTITIS WITHOUT HEMATURIA: ICD-10-CM

## 2021-06-09 DIAGNOSIS — E11.59 HYPERTENSION ASSOCIATED WITH TYPE 2 DIABETES MELLITUS (HCC): ICD-10-CM

## 2021-06-09 DIAGNOSIS — F41.9 ANXIETY AND DEPRESSION: ICD-10-CM

## 2021-06-09 DIAGNOSIS — F32.A ANXIETY AND DEPRESSION: ICD-10-CM

## 2021-06-09 DIAGNOSIS — E11.65 UNCONTROLLED TYPE 2 DIABETES MELLITUS WITH HYPERGLYCEMIA (HCC): Primary | ICD-10-CM

## 2021-06-09 DIAGNOSIS — Z12.11 COLON CANCER SCREENING: ICD-10-CM

## 2021-06-09 PROCEDURE — 99214 OFFICE O/P EST MOD 30 MIN: CPT | Performed by: INTERNAL MEDICINE

## 2021-06-09 PROCEDURE — 3074F SYST BP LT 130 MM HG: CPT | Performed by: INTERNAL MEDICINE

## 2021-06-09 PROCEDURE — 3078F DIAST BP <80 MM HG: CPT | Performed by: INTERNAL MEDICINE

## 2021-06-09 PROCEDURE — 3008F BODY MASS INDEX DOCD: CPT | Performed by: INTERNAL MEDICINE

## 2021-06-09 RX ORDER — GLIPIZIDE 5 MG/1
TABLET, FILM COATED, EXTENDED RELEASE ORAL
Qty: 90 TABLET | Refills: 0 | Status: SHIPPED | OUTPATIENT
Start: 2021-06-09 | End: 2021-09-01

## 2021-06-09 RX ORDER — GLIPIZIDE 5 MG/1
5 TABLET, FILM COATED, EXTENDED RELEASE ORAL DAILY
Qty: 30 TABLET | Refills: 0 | Status: SHIPPED | OUTPATIENT
Start: 2021-06-09 | End: 2021-06-09

## 2021-06-09 RX ORDER — AMOXICILLIN 500 MG/1
500 TABLET, FILM COATED ORAL 3 TIMES DAILY
Qty: 21 TABLET | Refills: 0 | Status: SHIPPED | OUTPATIENT
Start: 2021-06-09 | End: 2021-08-02 | Stop reason: ALTCHOICE

## 2021-06-09 NOTE — PROGRESS NOTES
HPI/Subjective:     Patient ID: Ginger Richardson is a 73year old female. Diabetes  She presents for her follow-up diabetic visit. She has type 2 diabetes mellitus. Her disease course has been worsening.  Hypoglycemia symptoms include nervousness/anxious disease or a hypertension causing med. UTI  This is a new problem. The current episode started 1 to 4 weeks ago. The problem occurs constantly. The problem has been gradually improving. Associated symptoms include urinary symptoms.  Pertinent negatives in 14 capsule 0   • Phenazopyridine HCl (PYRIDIUM) 100 MG Oral Tab Take 1 tablet (100 mg total) by mouth 3 (three) times daily as needed for Pain.  (Patient not taking: Reported on 6/9/2021 ) 15 tablet 0     Allergies:No Known Allergies    Past Medical History Abdomen is flat. Bowel sounds are normal. There is no distension. Palpations: Abdomen is soft. There is no mass. Tenderness: There is no abdominal tenderness. There is no right CVA tenderness, left CVA tenderness or guarding.    Musculoskeletal: psychiatry so we will refer her to Julee Taveras.   Patient will call us back if they she develops or starts having any suicidal homicidal thoughts.    (N30.00) Acute cystitis without hematuria  Plan: Repeat the UA and urine culture are still abnormal and lemuel

## 2021-06-10 ENCOUNTER — TELEPHONE (OUTPATIENT)
Dept: INTERNAL MEDICINE CLINIC | Facility: CLINIC | Age: 66
End: 2021-06-10

## 2021-06-10 NOTE — TELEPHONE ENCOUNTER
Spoke with patient, verified  and name. Informed of Dr. Stephany Rankin instructions below. Patient denies need for earlier appointment. Advised to call back with worsening symptoms or elevated blood sugars. Went over diabetes diet with patient.   No othe

## 2021-06-10 NOTE — TELEPHONE ENCOUNTER
Patient states she started taking Glipizide today. Her blood sugar before breakfast was 209. She ate breakfast.     Patient states she felt a little shaky at 2 pm and checked blood sugar and it was 105. Patient advised that is a normal blood sugar level.

## 2021-06-10 NOTE — TELEPHONE ENCOUNTER
I dont recall having evaluated her hip before; tylenol still the safest med to take for pain, she can also try glucosamine otc which are used for arthritis.  She will be ffup with me in 4 weeks for her dm so would evaluate her hip pain then but she can come

## 2021-06-12 ENCOUNTER — APPOINTMENT (OUTPATIENT)
Dept: ENDOCRINOLOGY | Facility: HOSPITAL | Age: 66
End: 2021-06-12
Attending: INTERNAL MEDICINE
Payer: MEDICARE

## 2021-06-14 ENCOUNTER — APPOINTMENT (OUTPATIENT)
Dept: GENERAL RADIOLOGY | Age: 66
End: 2021-06-14
Attending: NURSE PRACTITIONER
Payer: MEDICARE

## 2021-06-14 ENCOUNTER — TELEPHONE (OUTPATIENT)
Dept: INTERNAL MEDICINE CLINIC | Facility: CLINIC | Age: 66
End: 2021-06-14

## 2021-06-14 ENCOUNTER — HOSPITAL ENCOUNTER (OUTPATIENT)
Age: 66
Discharge: HOME | End: 2021-06-14
Payer: MEDICARE

## 2021-06-14 ENCOUNTER — NURSE TRIAGE (OUTPATIENT)
Dept: INTERNAL MEDICINE CLINIC | Facility: CLINIC | Age: 66
End: 2021-06-14

## 2021-06-14 VITALS
DIASTOLIC BLOOD PRESSURE: 78 MMHG | SYSTOLIC BLOOD PRESSURE: 110 MMHG | TEMPERATURE: 98 F | OXYGEN SATURATION: 99 % | RESPIRATION RATE: 18 BRPM | HEART RATE: 86 BPM

## 2021-06-14 DIAGNOSIS — S99.929A TOE INJURY: Primary | ICD-10-CM

## 2021-06-14 PROCEDURE — 90715 TDAP VACCINE 7 YRS/> IM: CPT | Performed by: NURSE PRACTITIONER

## 2021-06-14 PROCEDURE — 99213 OFFICE O/P EST LOW 20 MIN: CPT | Performed by: NURSE PRACTITIONER

## 2021-06-14 PROCEDURE — 73660 X-RAY EXAM OF TOE(S): CPT | Performed by: NURSE PRACTITIONER

## 2021-06-14 PROCEDURE — 90471 IMMUNIZATION ADMIN: CPT | Performed by: NURSE PRACTITIONER

## 2021-06-14 PROCEDURE — 29550 STRAPPING OF TOES: CPT | Performed by: NURSE PRACTITIONER

## 2021-06-14 NOTE — TELEPHONE ENCOUNTER
Action Requested: Summary for Provider     []  Critical Lab, Recommendations Needed  [] Need Additional Advice  []   FYI    []   Need Orders  [] Need Medications Sent to Pharmacy  []  Other     SUMMARY: pt going to IC for toe injury.     Pt states \"I dropp

## 2021-06-15 ENCOUNTER — HOSPITAL ENCOUNTER (OUTPATIENT)
Dept: ENDOCRINOLOGY | Facility: HOSPITAL | Age: 66
Discharge: HOME | End: 2021-06-15
Attending: INTERNAL MEDICINE
Payer: MEDICARE

## 2021-06-15 ENCOUNTER — TELEPHONE (OUTPATIENT)
Dept: PODIATRY CLINIC | Facility: CLINIC | Age: 66
End: 2021-06-15

## 2021-06-15 VITALS — BODY MASS INDEX: 33 KG/M2 | WEIGHT: 163 LBS

## 2021-06-15 DIAGNOSIS — E11.9 TYPE 2 DIABETES MELLITUS WITHOUT COMPLICATION, WITHOUT LONG-TERM CURRENT USE OF INSULIN (HCC): Primary | ICD-10-CM

## 2021-06-15 NOTE — TELEPHONE ENCOUNTER
Pt was in [facility] yesterday for toe inj - she does not think it was wrapped will and is concerned about inf - asking to see  this week

## 2021-06-15 NOTE — TELEPHONE ENCOUNTER
Amoxicillin was fine for her uti based on result of urine culture. She should see podiatrist for her toe fracture with wound. We can rrefer her to Dr Melody Aranda or partners.

## 2021-06-15 NOTE — TELEPHONE ENCOUNTER
Patient is a new patient would like to be seen soon as she feels toe was not wrapped correctly. Injured left pinky toe. States is cut and see it still draining. Appointment offered for tomorrow 6/16/21 with Dr. aCrri Hwang.  Patient agreed to appointment schedul

## 2021-06-15 NOTE — TELEPHONE ENCOUNTER
Pt was called and informed of Dr. Luke Ryan message below.  Pt will come in at 2:45    Future Appointments   Date Time Provider Ashley Sanders   6/16/2021  2:45 PM MD MOISE HollinsO

## 2021-06-15 NOTE — PROGRESS NOTES
Edmondarchana Ochoa  : 3/18/1955 attended individual initial assessment for Diabetes Education:    Date: 6/15/2021   Start time: 830 End time: 930      HgbA1C (%)   Date Value   2021 9.2 (H)        Assessment: 73year old female presents for diabet classes    Prescriptions sent to preferred pharmacy for blood glucose meter, test strips and lancets per protocol. Written materials provided for all areas covered. Patient verbalized understanding and has no further questions at this time.     Cinda Berry

## 2021-06-15 NOTE — TELEPHONE ENCOUNTER
NP called from urgent care. Pt has a fracture and cut of left 5th toe. Pt is on Amoxicillin for a UTI.   Will continue amoxicillin for both UTI and skin and f/u with Marquez.

## 2021-06-15 NOTE — TELEPHONE ENCOUNTER
Patient wants to know if Dr. Camilo Rahman agree's with her continuing the Amoxicillin for the UTI.   Per patient, the NP at  advised patient that she should probably be on a different medication for a UTI so this was brought up with Dr Irina Mcgregor (see note [place]

## 2021-06-15 NOTE — ED PROVIDER NOTES
Patient Seen in: [facility]      History   Patient presents with:   Toe Pain    Stated Complaint: Left toe injury    HPI/Subjective:   HPI    29-year-old female, with history of diabetes and hypertension, presents to the immediate care with an deformity minimal pain   Neurological:      General: No focal deficit present. Mental Status: She is alert. Cranial Nerves: No cranial nerve deficit. Sensory: No sensory deficit.              ED Course   Labs Reviewed - No data to display

## 2021-06-15 NOTE — TELEPHONE ENCOUNTER
The patient was called and informed. She stated she has her own podiatrist.   If not she will see a [place] podiatrist.    The patient was concerned about her wound care. She stated she will not be able to do her wound care.    I stated to make the appoi

## 2021-06-16 NOTE — TELEPHONE ENCOUNTER
pt. states that she is not able to come in today for Consult appt. for Left pinky toe injury, as she has no transportation. Pt. Is requesting to be seen sooner then 1st avail. ,

## 2021-06-16 NOTE — TELEPHONE ENCOUNTER
Called pt and offered her appt on 6/17 @ 220pm at [facility] office with Dr. Anthony Barros.

## 2021-06-17 ENCOUNTER — TELEPHONE (OUTPATIENT)
Dept: INTERNAL MEDICINE CLINIC | Facility: CLINIC | Age: 66
End: 2021-06-17

## 2021-06-17 NOTE — TELEPHONE ENCOUNTER
Patient informed of message.      , Mary Sorto 46 minutes ago (10:51 AM)   [facility] you are well     Referral is approved no auth required     See referal [id number]     24 Margot Delgado    Message text

## 2021-06-17 NOTE — TELEPHONE ENCOUNTER
Per patient she needs a referral/order to go to [facility] for diabetic Medical Nutrition therapy. Patient has an appointment on 06/25/2021.

## 2021-06-18 ENCOUNTER — OFFICE VISIT (OUTPATIENT)
Dept: INTERNAL MEDICINE CLINIC | Facility: CLINIC | Age: 66
End: 2021-06-18
Payer: COMMERCIAL

## 2021-06-18 VITALS
BODY MASS INDEX: 33.87 KG/M2 | SYSTOLIC BLOOD PRESSURE: 104 MMHG | DIASTOLIC BLOOD PRESSURE: 67 MMHG | WEIGHT: 168 LBS | TEMPERATURE: 97 F | HEIGHT: 59 IN | HEART RATE: 103 BPM

## 2021-06-18 DIAGNOSIS — Z86.14 HISTORY OF MRSA INFECTION: ICD-10-CM

## 2021-06-18 DIAGNOSIS — S91.115A LACERATION OF LESSER TOE OF LEFT FOOT WITHOUT FOREIGN BODY PRESENT, NAIL DAMAGE STATUS UNSPECIFIED, INITIAL ENCOUNTER: ICD-10-CM

## 2021-06-18 DIAGNOSIS — S92.535A CLOSED NONDISPLACED FRACTURE OF DISTAL PHALANX OF LESSER TOE OF LEFT FOOT, INITIAL ENCOUNTER: Primary | ICD-10-CM

## 2021-06-18 PROCEDURE — 3078F DIAST BP <80 MM HG: CPT | Performed by: INTERNAL MEDICINE

## 2021-06-18 PROCEDURE — 99213 OFFICE O/P EST LOW 20 MIN: CPT | Performed by: INTERNAL MEDICINE

## 2021-06-18 PROCEDURE — 3074F SYST BP LT 130 MM HG: CPT | Performed by: INTERNAL MEDICINE

## 2021-06-18 PROCEDURE — 3008F BODY MASS INDEX DOCD: CPT | Performed by: INTERNAL MEDICINE

## 2021-06-18 NOTE — PROGRESS NOTES
History of Present Illness   Patient ID: Mendoza Mercer is a 73year old female. Chief Complaint: Toe Injury (pinky toe left foot injury)    6/14/2021 was seen in urgent care, notes reviewed and appreciated.   Patient dropped a can 1 hour prior to arriva range of motion. Cervical back: Normal range of motion and neck supple. Left foot: Normal range of motion and normal capillary refill. Bony tenderness present. No tenderness. Feet:    Skin:     General: Skin is warm.    Neurological:      G Use      Smoking status: Former Smoker        Types: Cigarettes      Smokeless tobacco: Never Used      Tobacco comment: occasional smoker, stopped in [id number]    Vaping Use      Vaping Use: Never used    Alcohol use: Yes    Drug use: Never     Reviewed C understands and agrees to follow directions and advice. ----------------------------------------- PATIENT INSTRUCTIONS-----------------------------------------     There are no Patient Instructions on file for this visit.

## 2021-06-22 ENCOUNTER — TELEPHONE (OUTPATIENT)
Dept: INTERNAL MEDICINE CLINIC | Facility: CLINIC | Age: 66
End: 2021-06-22

## 2021-06-22 NOTE — TELEPHONE ENCOUNTER
Reached patient to discuss statin use in diabetes. Discussed guidelines and recommendations for statin therapy in patients with diabetes. Patient reports she \"does not trust statins\".  She is currently taking red yeast rice and it seems to be helping

## 2021-06-23 ENCOUNTER — OFFICE VISIT (OUTPATIENT)
Dept: PODIATRY CLINIC | Facility: CLINIC | Age: 66
End: 2021-06-23
Payer: COMMERCIAL

## 2021-06-23 ENCOUNTER — TELEPHONE (OUTPATIENT)
Dept: INTERNAL MEDICINE CLINIC | Facility: CLINIC | Age: 66
End: 2021-06-23

## 2021-06-23 VITALS — HEIGHT: 59.5 IN | WEIGHT: 168 LBS | BODY MASS INDEX: 33.42 KG/M2

## 2021-06-23 DIAGNOSIS — E11.9 TYPE 2 DIABETES MELLITUS WITHOUT COMPLICATION, WITHOUT LONG-TERM CURRENT USE OF INSULIN (HCC): Primary | ICD-10-CM

## 2021-06-23 DIAGNOSIS — M79.675 PAIN IN TOES OF BOTH FEET: ICD-10-CM

## 2021-06-23 DIAGNOSIS — B35.1 ONYCHOMYCOSIS: ICD-10-CM

## 2021-06-23 DIAGNOSIS — R82.90 ABNORMAL URINALYSIS: ICD-10-CM

## 2021-06-23 DIAGNOSIS — M79.674 PAIN IN TOES OF BOTH FEET: ICD-10-CM

## 2021-06-23 DIAGNOSIS — S92.535A CLOSED NONDISPLACED FRACTURE OF DISTAL PHALANX OF LESSER TOE OF LEFT FOOT, INITIAL ENCOUNTER: ICD-10-CM

## 2021-06-23 PROCEDURE — 3008F BODY MASS INDEX DOCD: CPT | Performed by: PODIATRIST

## 2021-06-23 PROCEDURE — 11721 DEBRIDE NAIL 6 OR MORE: CPT | Performed by: PODIATRIST

## 2021-06-23 PROCEDURE — 99203 OFFICE O/P NEW LOW 30 MIN: CPT | Performed by: PODIATRIST

## 2021-06-23 NOTE — TELEPHONE ENCOUNTER
Patient was informed that order for diabetic dietician has been signed.     She mentioned that Dr. Michaela Mckenzie stated at her last office visit on 06/09/21 that she should repeat a UA and urine culture:    As seen in progress note:    N30.00) Acute cystitis w

## 2021-06-23 NOTE — TELEPHONE ENCOUNTER
Call was transferred [facility] agent. Patient calling to report that she needs an order for a diabetes dietician specifically. She reports that she is upset that she has been attempting to get this order with no success.     Patient stated \"I need to

## 2021-06-23 NOTE — PROGRESS NOTES
HPI:    Patient ID: Africa Tate is a 73year old female. This pleasant 67-year-old diabetic presents to me today as a new patient on referral from [address].   Patient states that she is here because of an injury to the fifth toe of the left foot and s • OneTouch UltraSoft Lancets Does not apply Misc 1 each by Other route 2 (two) times a day. 200 each 3   • Blood Glucose Monitoring Suppl (ONETOUCH VERIO) w/Device Does not apply Kit 1 kit by Does not apply route 2 (two) times a day.  1 kit 0   • Glucose lotion. She does have bunions and overlapping second toes that presently cause no symptoms. I do not see reason for follow-up in reference to this fifth toe left toe unless concerns are noted.   She is well-informed and indicates an understanding follow-u

## 2021-06-24 ENCOUNTER — NURSE TRIAGE (OUTPATIENT)
Dept: INTERNAL MEDICINE CLINIC | Facility: CLINIC | Age: 66
End: 2021-06-24

## 2021-06-24 NOTE — TELEPHONE ENCOUNTER
Action Requested: Summary for Provider     []  Critical Lab, Recommendations Needed  [] Need Additional Advice  [x]   FYI    []   Need Orders  [] Need Medications Sent to Pharmacy  []  Other     SUMMARY: Pt states she had been bitten on her left forearm ea

## 2021-06-24 NOTE — TELEPHONE ENCOUNTER
Spoke with pt, TESSA verified  Pt was informed of MD recommendation, pt stated she is unable to go to IC today due to she has no time and no car  Pt stated she has sore throat and the person or her roommate has poss bronchitis and she think she might have co

## 2021-06-24 NOTE — TELEPHONE ENCOUNTER
I agree with advise to send her at least in IC due to her cough so she can be checked out for covid. They can also evaluate her cat bite wound, this are considered as dirty wounds and needs to be cleaned and checked out right away.

## 2021-06-25 ENCOUNTER — HOSPITAL ENCOUNTER (OUTPATIENT)
Dept: ENDOCRINOLOGY | Facility: HOSPITAL | Age: 66
Discharge: HOME | End: 2021-06-25
Attending: INTERNAL MEDICINE
Payer: MEDICARE

## 2021-06-25 DIAGNOSIS — E11.9 TYPE 2 DIABETES MELLITUS WITHOUT COMPLICATION, WITHOUT LONG-TERM CURRENT USE OF INSULIN (HCC): ICD-10-CM

## 2021-06-25 PROCEDURE — 97802 MEDICAL NUTRITION INDIV IN: CPT

## 2021-06-25 NOTE — PROGRESS NOTES
Medical Nutrition Therapy Assessment    Jahairaralph Munguia 3/18/1955 was seen for individual Diabetic Medical Nutrition Therapy/ Initial/ Individual:    Date: 6/25/2021   Start time 1300  End time: 5      Assessment  H/o diabetes for several years, saylevi bullock concentrated sweets.  Worked on establishing eating pattern/timing of meals and snacks    [x] discussed in depth meal planning using the healthy eating with diabetes plate method with focus on balanced macronutrient consumption, including identifying foods

## 2021-07-08 ENCOUNTER — HOSPITAL ENCOUNTER (OUTPATIENT)
Dept: ENDOCRINOLOGY | Facility: HOSPITAL | Age: 66
Discharge: HOME | End: 2021-07-08
Attending: INTERNAL MEDICINE
Payer: MEDICARE

## 2021-07-08 VITALS — BODY MASS INDEX: 33 KG/M2 | WEIGHT: 167 LBS

## 2021-07-08 DIAGNOSIS — E11.9 TYPE 2 DIABETES MELLITUS WITHOUT COMPLICATION, WITHOUT LONG-TERM CURRENT USE OF INSULIN (HCC): Primary | ICD-10-CM

## 2021-07-08 NOTE — PROGRESS NOTES
Michael Deleon  [id number] attended Step 2 Group Diabetes Education Class:    Date: 7/8/2021  Start time: 1:00 pm End time: 3:00 pm    Wt: 167 lb     Diabetes Overview, pathophysiology, pre-diabetes, A1C results and treatment options for diabetes self-

## 2021-07-15 ENCOUNTER — HOSPITAL ENCOUNTER (OUTPATIENT)
Dept: ENDOCRINOLOGY | Facility: HOSPITAL | Age: 66
Discharge: HOME | End: 2021-07-15
Attending: INTERNAL MEDICINE
Payer: MEDICARE

## 2021-07-15 VITALS — WEIGHT: 167 LBS | BODY MASS INDEX: 33 KG/M2

## 2021-07-15 DIAGNOSIS — E11.9 TYPE 2 DIABETES MELLITUS WITHOUT COMPLICATION, WITHOUT LONG-TERM CURRENT USE OF INSULIN (HCC): Primary | ICD-10-CM

## 2021-07-15 NOTE — PROGRESS NOTES
Louann Miller  [facility][id number] attended Step 3 Group Diabetes Education Class:    Date: 7/15/2021  Start time: 1300  End time: 1500    Wt: 167 lb Weight change since start of program: none    Blood Glucose: Not controlled: Progressing toward goal        He

## 2021-07-22 ENCOUNTER — HOSPITAL ENCOUNTER (OUTPATIENT)
Dept: ENDOCRINOLOGY | Facility: HOSPITAL | Age: 66
Discharge: HOME | End: 2021-07-22
Attending: INTERNAL MEDICINE
Payer: MEDICARE

## 2021-07-22 VITALS — BODY MASS INDEX: 33 KG/M2 | WEIGHT: 166 LBS

## 2021-07-22 DIAGNOSIS — E11.9 TYPE 2 DIABETES MELLITUS WITHOUT COMPLICATION, WITHOUT LONG-TERM CURRENT USE OF INSULIN (HCC): Primary | ICD-10-CM

## 2021-07-22 NOTE — PROGRESS NOTES
Stephanie Walter  [id number] attended Step 4 Group Diabetes Education Class:     Date: 7/22/2021  Start time: 1:00 pm End time: 2:45 pm    Wt: 166 lb Weight change since start of program: + 1 lbs    Blood Glucose: Controlled: Stable  Checking daily in th

## 2021-08-02 ENCOUNTER — TELEPHONE (OUTPATIENT)
Dept: INTERNAL MEDICINE CLINIC | Facility: CLINIC | Age: 66
End: 2021-08-02

## 2021-08-02 DIAGNOSIS — E11.9 CONTROLLED TYPE 2 DIABETES MELLITUS WITHOUT COMPLICATION, WITHOUT LONG-TERM CURRENT USE OF INSULIN (HCC): ICD-10-CM

## 2021-08-02 DIAGNOSIS — Z12.31 VISIT FOR SCREENING MAMMOGRAM: Primary | ICD-10-CM

## 2021-08-02 RX ORDER — LANCETS
1 EACH MISCELLANEOUS 3 TIMES DAILY
Qty: 200 EACH | Refills: 5 | Status: SHIPPED | OUTPATIENT
Start: 2021-08-02

## 2021-08-02 NOTE — TELEPHONE ENCOUNTER
Patient has multiple refill requests: OneTouch Verio strip, Metformin and Lisinopril.  Medication pended for your review and approval.      Patient spoke to diabetes educator and was informed by them that they would change her prescription for her test stri

## 2021-08-02 NOTE — TELEPHONE ENCOUNTER
Patient would like Dr. Kasandra Monsalve to know that she likes the Glipizide and would like to continue it if that is okay. She will get her UA/CS done soon as ordered. She would also like to know when Dr. Kasandra Monsalve would like to see her again?      Please a

## 2021-08-02 NOTE — TELEPHONE ENCOUNTER
Monika Sanders from the [facility] she will take care of the diabetic medications.

## 2021-08-02 NOTE — PROGRESS NOTES
Request from Dr. Baljinder Phelps office to send additional strips for pt, as pt discussed in class with Janet Storey. Noted [facility] medicare, which medicare plans will likely not cover testing TID.  Sent to pharmacy and will notify pt that it was sent and pending co

## 2021-08-02 NOTE — TELEPHONE ENCOUNTER
Patient advised of recommendations agreed with plan. Advised to call back for her 1 month follow up. She also wanted to follow up on getting a cream psoriasis and repeating her Hba1c. Agreed to call back for appt.

## 2021-08-03 ENCOUNTER — LAB ENCOUNTER (OUTPATIENT)
Dept: LAB | Age: 66
End: 2021-08-03
Attending: INTERNAL MEDICINE
Payer: MEDICARE

## 2021-08-03 DIAGNOSIS — R82.90 ABNORMAL URINALYSIS: ICD-10-CM

## 2021-08-03 LAB
BILIRUB UR QL: NEGATIVE
CLARITY UR: CLEAR
COLOR UR: YELLOW
GLUCOSE UR-MCNC: NEGATIVE MG/DL
KETONES UR-MCNC: NEGATIVE MG/DL
NITRITE UR QL STRIP.AUTO: NEGATIVE
PH UR: 7 [PH] (ref 5–8)
PROT UR-MCNC: NEGATIVE MG/DL
SP GR UR STRIP: 1.01 (ref 1–1.03)
UROBILINOGEN UR STRIP-ACNC: <2
WBC #/AREA URNS AUTO: >50 /HPF

## 2021-08-03 PROCEDURE — 81001 URINALYSIS AUTO W/SCOPE: CPT

## 2021-08-03 PROCEDURE — 87186 SC STD MICRODIL/AGAR DIL: CPT

## 2021-08-03 PROCEDURE — 87088 URINE BACTERIA CULTURE: CPT

## 2021-08-03 PROCEDURE — 87086 URINE CULTURE/COLONY COUNT: CPT

## 2021-08-03 RX ORDER — BLOOD SUGAR DIAGNOSTIC
1 STRIP MISCELLANEOUS 3 TIMES DAILY
Qty: 300 STRIP | Refills: 11 | Status: SHIPPED | OUTPATIENT
Start: 2021-08-03

## 2021-08-03 RX ORDER — AMOXICILLIN 500 MG/1
500 TABLET, FILM COATED ORAL 3 TIMES DAILY
Qty: 21 TABLET | Refills: 0 | Status: SHIPPED | OUTPATIENT
Start: 2021-08-03

## 2021-08-03 RX ORDER — LISINOPRIL 5 MG/1
5 TABLET ORAL DAILY
Qty: 90 TABLET | Refills: 1 | Status: SHIPPED | OUTPATIENT
Start: 2021-08-03

## 2021-08-03 NOTE — TELEPHONE ENCOUNTER
Ok to give her amoxicilline 500mg po tid for one week; As to cream for psoriasis, will give her triamcinolone cream. erx sent for both meds.

## 2021-08-03 NOTE — TELEPHONE ENCOUNTER
Spoke with patient ( verified)--following up on request for UA and urine C & S    \"There's just the start of a urinary tract infection--I would like to give a sample today. But I would also like antibiotics to my pharmacy.  I am aware the antibiotic may

## 2021-08-03 NOTE — TELEPHONE ENCOUNTER
Advised patient of Dr. Bethany Salinas note. Patient verbalized understanding  Patient told RN to tell Dr. Denise Chu you\".

## 2021-08-03 NOTE — TELEPHONE ENCOUNTER
Spoke with patient ( verified)--\"I think this is ludicrous that I have to get my diabetic medications from the diabetic educator. I only have one more class with them. I have more confidence with Dr. Kasandra Monsalve to fill my medications.  I am going out of

## 2021-08-09 ENCOUNTER — NURSE TRIAGE (OUTPATIENT)
Dept: INTERNAL MEDICINE CLINIC | Facility: CLINIC | Age: 66
End: 2021-08-09

## 2021-08-09 NOTE — TELEPHONE ENCOUNTER
Action Requested: Summary for Provider     []  Critical Lab, Recommendations Needed  [] Need Additional Advice  []   FYI    []   Need Orders  [] Need Medications Sent to Pharmacy  []  Other     SUMMARY: onset yesterday, grocery cart fell onto the back of h

## 2021-08-11 ENCOUNTER — TELEPHONE (OUTPATIENT)
Dept: INTERNAL MEDICINE CLINIC | Facility: CLINIC | Age: 66
End: 2021-08-11

## 2021-08-11 DIAGNOSIS — N30.00 ACUTE CYSTITIS WITHOUT HEMATURIA: Primary | ICD-10-CM

## 2021-08-11 NOTE — TELEPHONE ENCOUNTER
Dr Juana Khan,    1) Pt requesting anti-inflammatory for her chronic arthritis. Stated MD aware of her health conditions. No meloxicam.  2) Should pt repeat ua/c&s 2 weeks after finishing antibiotic for urinary infection?    2) Pt would like to have labs do

## 2021-08-12 NOTE — TELEPHONE ENCOUNTER
Too soon for labs so not due yet  For nsaids, she can try aleve   We can repeat her ua ; order in epic

## 2021-08-24 ENCOUNTER — TELEPHONE (OUTPATIENT)
Dept: INTERNAL MEDICINE CLINIC | Facility: CLINIC | Age: 66
End: 2021-08-24

## 2021-08-26 ENCOUNTER — TELEPHONE (OUTPATIENT)
Dept: INTERNAL MEDICINE CLINIC | Facility: CLINIC | Age: 66
End: 2021-08-26

## 2021-08-26 ENCOUNTER — HOSPITAL ENCOUNTER (OUTPATIENT)
Dept: ENDOCRINOLOGY | Facility: HOSPITAL | Age: 66
Discharge: HOME | End: 2021-08-26
Attending: INTERNAL MEDICINE
Payer: MEDICARE

## 2021-08-26 VITALS — WEIGHT: 168 LBS | BODY MASS INDEX: 33 KG/M2

## 2021-08-26 DIAGNOSIS — E11.9 TYPE 2 DIABETES MELLITUS WITHOUT COMPLICATION, WITHOUT LONG-TERM CURRENT USE OF INSULIN (HCC): Primary | ICD-10-CM

## 2021-08-26 NOTE — PROGRESS NOTES
Odalys Meet  [id number] attended Step 5 Group Diabetes Education Class:    Date: 8/26/2021  Start time: 1300  End time: 1500    Wt: 168 lb Weight change since start of program: none      Blood Glucose: Not controlled: Progressing toward goal    Heal

## 2021-08-26 NOTE — TELEPHONE ENCOUNTER
Patient going to the [facility] at 1pm to repeat her urine and wanted to get an order for a hemoglobin A1c as well. Please advise.      Warm transferred patient to phone room to schedule medicare physical. Received referral for Hypochromic microcytic anemia and LLQ abdominal pain. Please advise.

## 2021-08-27 NOTE — TELEPHONE ENCOUNTER
The patient called back and was informed with understanding. She also asked about a medicare physical.  Call transferred to the call center.

## 2021-09-01 RX ORDER — GLIPIZIDE 5 MG/1
5 TABLET, FILM COATED, EXTENDED RELEASE ORAL DAILY
Qty: 90 TABLET | Refills: 0 | Status: SHIPPED | OUTPATIENT
Start: 2021-09-01

## 2021-10-27 ENCOUNTER — TELEPHONE (OUTPATIENT)
Dept: INTERNAL MEDICINE CLINIC | Facility: CLINIC | Age: 66
End: 2021-10-27

## 2021-10-27 NOTE — TELEPHONE ENCOUNTER
Patient  is due to schedule a yearly Medicare Physical appointment with Dr Bonnie Jeffery. No option to leave voice message.

## 2021-10-28 ENCOUNTER — TELEPHONE (OUTPATIENT)
Dept: INTERNAL MEDICINE CLINIC | Facility: CLINIC | Age: 66
End: 2021-10-28

## 2021-10-28 NOTE — TELEPHONE ENCOUNTER
The patient stated that [facility] sent over release of information form . Her records need to be sent.     Clinical staff or release of information if they were not received please call   [phone]

## 2021-10-28 NOTE — TELEPHONE ENCOUNTER
RAHUL not received in providers faxes for today. Please advise, if RAHUL has been received on your end.

## 2022-08-05 ENCOUNTER — TELEPHONE (OUTPATIENT)
Dept: INTERNAL MEDICINE CLINIC | Facility: CLINIC | Age: 67
End: 2022-08-05

## 2022-08-05 NOTE — TELEPHONE ENCOUNTER
Recording states patient is not accepting voice mails right now to send a text message. Unable to contact patient related to outstanding lab orders from 8/ 11/2021. Last office visit with Dr. Milady De La Torre,  6/9/21.

## (undated) NOTE — ED AVS SNAPSHOT
Aretha Jain   MRN: [id number]    Department:  [facility] Emergency Department   Date of Visit:  3/4/2020           Disclosure     Insurance plans vary and the physician(s) referred by the ER may not be covered by your plan.  Please contact yo within the next three months to obtain basic health screening including reassessment of your blood pressure.     IF THERE IS ANY CHANGE OR WORSENING OF YOUR CONDITION, CALL YOUR PRIMARY CARE PHYSICIAN AT ONCE OR RETURN IMMEDIATELY TO THE EMERGENCY DEPARTMEN

## (undated) NOTE — LETTER
June 23, 2021         MD Linda Lucas [address] [facility]      Patient: Louie Phelps   YOB: 1955   Date of Visit: 6/23/2021       Dear Dr. Nabil Reyes MD,    I saw your patient, Louie Phelps, on 6/23/2021.  Enc